# Patient Record
Sex: FEMALE | Race: WHITE
[De-identification: names, ages, dates, MRNs, and addresses within clinical notes are randomized per-mention and may not be internally consistent; named-entity substitution may affect disease eponyms.]

---

## 2019-07-12 ENCOUNTER — HOSPITAL ENCOUNTER (OUTPATIENT)
Dept: HOSPITAL 89 - ZZGRANDUC | Age: 19
End: 2019-07-12
Attending: NURSE PRACTITIONER
Payer: COMMERCIAL

## 2019-07-12 DIAGNOSIS — E11.9: Primary | ICD-10-CM

## 2019-07-12 LAB — PLATELET COUNT, AUTOMATED: 298 K/UL (ref 150–450)

## 2019-07-12 PROCEDURE — 84155 ASSAY OF PROTEIN SERUM: CPT

## 2019-07-12 PROCEDURE — 82435 ASSAY OF BLOOD CHLORIDE: CPT

## 2019-07-12 PROCEDURE — 82040 ASSAY OF SERUM ALBUMIN: CPT

## 2019-07-12 PROCEDURE — 82947 ASSAY GLUCOSE BLOOD QUANT: CPT

## 2019-07-12 PROCEDURE — 82374 ASSAY BLOOD CARBON DIOXIDE: CPT

## 2019-07-12 PROCEDURE — 84460 ALANINE AMINO (ALT) (SGPT): CPT

## 2019-07-12 PROCEDURE — 84075 ASSAY ALKALINE PHOSPHATASE: CPT

## 2019-07-12 PROCEDURE — 82310 ASSAY OF CALCIUM: CPT

## 2019-07-12 PROCEDURE — 84450 TRANSFERASE (AST) (SGOT): CPT

## 2019-07-12 PROCEDURE — 83036 HEMOGLOBIN GLYCOSYLATED A1C: CPT

## 2019-07-12 PROCEDURE — 85025 COMPLETE CBC W/AUTO DIFF WBC: CPT

## 2019-07-12 PROCEDURE — 84520 ASSAY OF UREA NITROGEN: CPT

## 2019-07-12 PROCEDURE — 82247 BILIRUBIN TOTAL: CPT

## 2019-07-12 PROCEDURE — 84132 ASSAY OF SERUM POTASSIUM: CPT

## 2019-07-12 PROCEDURE — 82565 ASSAY OF CREATININE: CPT

## 2019-07-12 PROCEDURE — 84295 ASSAY OF SERUM SODIUM: CPT

## 2019-07-16 ENCOUNTER — HOSPITAL ENCOUNTER (OUTPATIENT)
Dept: HOSPITAL 89 - ZZGRANDUC | Age: 19
End: 2019-07-16
Attending: NURSE PRACTITIONER
Payer: COMMERCIAL

## 2019-07-16 DIAGNOSIS — Z86.39: Primary | ICD-10-CM

## 2019-07-16 LAB — PLATELET COUNT, AUTOMATED: 305 K/UL (ref 150–450)

## 2019-07-16 PROCEDURE — 84155 ASSAY OF PROTEIN SERUM: CPT

## 2019-07-16 PROCEDURE — 82374 ASSAY BLOOD CARBON DIOXIDE: CPT

## 2019-07-16 PROCEDURE — 86337 INSULIN ANTIBODIES: CPT

## 2019-07-16 PROCEDURE — 82247 BILIRUBIN TOTAL: CPT

## 2019-07-16 PROCEDURE — 84681 ASSAY OF C-PEPTIDE: CPT

## 2019-07-16 PROCEDURE — 84460 ALANINE AMINO (ALT) (SGPT): CPT

## 2019-07-16 PROCEDURE — 82310 ASSAY OF CALCIUM: CPT

## 2019-07-16 PROCEDURE — 84295 ASSAY OF SERUM SODIUM: CPT

## 2019-07-16 PROCEDURE — 85025 COMPLETE CBC W/AUTO DIFF WBC: CPT

## 2019-07-16 PROCEDURE — 82947 ASSAY GLUCOSE BLOOD QUANT: CPT

## 2019-07-16 PROCEDURE — 84520 ASSAY OF UREA NITROGEN: CPT

## 2019-07-16 PROCEDURE — 84132 ASSAY OF SERUM POTASSIUM: CPT

## 2019-07-16 PROCEDURE — 82435 ASSAY OF BLOOD CHLORIDE: CPT

## 2019-07-16 PROCEDURE — 82565 ASSAY OF CREATININE: CPT

## 2019-07-16 PROCEDURE — 84450 TRANSFERASE (AST) (SGOT): CPT

## 2019-07-16 PROCEDURE — 82040 ASSAY OF SERUM ALBUMIN: CPT

## 2019-07-16 PROCEDURE — 84075 ASSAY ALKALINE PHOSPHATASE: CPT

## 2019-07-23 ENCOUNTER — HOSPITAL ENCOUNTER (OUTPATIENT)
Dept: HOSPITAL 89 - LAB | Age: 19
End: 2019-07-23
Attending: FAMILY MEDICINE
Payer: COMMERCIAL

## 2019-07-23 DIAGNOSIS — E03.9: Primary | ICD-10-CM

## 2019-07-23 DIAGNOSIS — E11.65: ICD-10-CM

## 2019-07-23 PROCEDURE — 86376 MICROSOMAL ANTIBODY EACH: CPT

## 2019-07-23 PROCEDURE — 84481 FREE ASSAY (FT-3): CPT

## 2019-07-23 PROCEDURE — 86800 THYROGLOBULIN ANTIBODY: CPT

## 2019-07-23 PROCEDURE — 36415 COLL VENOUS BLD VENIPUNCTURE: CPT

## 2019-07-23 PROCEDURE — 84439 ASSAY OF FREE THYROXINE: CPT

## 2019-07-30 ENCOUNTER — HOSPITAL ENCOUNTER (OUTPATIENT)
Dept: HOSPITAL 89 - US | Age: 19
End: 2019-07-30
Attending: NURSE PRACTITIONER
Payer: COMMERCIAL

## 2019-07-30 DIAGNOSIS — L02.415: Primary | ICD-10-CM

## 2019-07-30 PROCEDURE — 76881 US COMPL JOINT R-T W/IMG: CPT

## 2019-07-30 NOTE — RADIOLOGY IMAGING REPORT
FACILITY: [facility] [place] 

 

PATIENT NAME: Cleo Vasquez

: 2000

MR: [id number]

V: [id number]

EXAM DATE: 

ORDERING PHYSICIAN: RACHANA PRUITT

TECHNOLOGIST: 

 

Location: [facility]

Patient: Cleo Vasquez

: 2000

MRN: [id number]

Visit/Account:[id number]

Date of Sevice:  2019

 

ACCESSION #: [id number]

 

EXAMINATION:

Ultrasound the low back

 

HISTORY:   Cyst drained in perianal region Friday now on antibiotics. Antibiotic change. Evaluate for
 abscess.

 

COMPARISON:   None.

 

FINDINGS:  High frequency linear ultrasound over the area of concern in the right posterior hip soft 
tissues was obtained. There is generalized soft tissue trabeculation. No fluid collection is identifi
ed in the area of concern.

 

IMPRESSION:  No fluid collection is identified. Diffuse soft tissue trabeculation.

 

Report Dictated By: Justice Smith MD at 2019 7:40 PM

 

Report E-Signed By: Justice Smith MD  at 2019 7:43 PM

 

WSN:[id number]